# Patient Record
Sex: FEMALE | Race: WHITE | Employment: FULL TIME | URBAN - METROPOLITAN AREA
[De-identification: names, ages, dates, MRNs, and addresses within clinical notes are randomized per-mention and may not be internally consistent; named-entity substitution may affect disease eponyms.]

---

## 2022-06-23 ENCOUNTER — OFFICE VISIT (OUTPATIENT)
Dept: OBGYN CLINIC | Age: 22
End: 2022-06-23
Payer: COMMERCIAL

## 2022-06-23 VITALS — WEIGHT: 186 LBS | SYSTOLIC BLOOD PRESSURE: 104 MMHG | DIASTOLIC BLOOD PRESSURE: 60 MMHG

## 2022-06-23 DIAGNOSIS — N64.52 BILATERAL NIPPLE DISCHARGE: Primary | ICD-10-CM

## 2022-06-23 PROCEDURE — 99204 OFFICE O/P NEW MOD 45 MIN: CPT | Performed by: ADVANCED PRACTICE MIDWIFE

## 2022-06-23 RX ORDER — ETONOGESTREL 68 MG/1
68 IMPLANT SUBCUTANEOUS ONCE
COMMUNITY
Start: 2019-11-21

## 2022-06-23 RX ORDER — SULFAMETHOXAZOLE AND TRIMETHOPRIM 800; 160 MG/1; MG/1
1 TABLET ORAL 2 TIMES DAILY
Qty: 10 TABLET | Refills: 0 | Status: SHIPPED | OUTPATIENT
Start: 2022-06-23 | End: 2022-06-28

## 2022-06-24 ASSESSMENT — ENCOUNTER SYMPTOMS
SHORTNESS OF BREATH: 0
VOMITING: 0
COUGH: 0
NAUSEA: 0
DIARRHEA: 0
ABDOMINAL PAIN: 0
CONSTIPATION: 0

## 2022-06-24 NOTE — PROGRESS NOTES
SUBJECTIVE:  Davina Dailey is a 20 y.o. female who presents here today for complaints of:      Chief Complaint   Patient presents with    Breast Problem     greenish disharge coming out of nipples x 2 months     Bilateral Nipple Discharge  Began noticing small/scant amounts of bilateral nipple discharge that is green in color. Denies breast pain/tenderness, inflammation, fever, skin changes, or any other unusual or abnormal breast symptoms. Denies nipple pain, sensitivity. Denies a history of nipple piercing, no breast tattoos. Symptoms began about 2 months ago and have neither worsened or improved over this time. Acknowledges that there is a possibility of infection caused by her boyfriends mouth. Review of Systems   Respiratory: Negative for cough and shortness of breath. Gastrointestinal: Negative for abdominal pain, constipation, diarrhea, nausea and vomiting. Genitourinary: Negative for difficulty urinating, dysuria, menstrual problem, pelvic pain, vaginal bleeding and vaginal discharge. All other systems reviewed and are negative. OBJECTIVE:  Vitals:  /60   Wt 186 lb (84.4 kg)     Physical Exam  Constitutional:       General: She is not in acute distress. Appearance: Normal appearance. She is not ill-appearing. HENT:      Mouth/Throat:      Mouth: Mucous membranes are moist.   Eyes:      General: No scleral icterus. Right eye: No discharge. Left eye: No discharge. Cardiovascular:      Rate and Rhythm: Normal rate. Pulmonary:      Effort: Pulmonary effort is normal. No respiratory distress. Chest:   Breasts: Breasts are symmetrical.      Right: Nipple discharge present. No swelling, bleeding, inverted nipple, mass, skin change, tenderness, axillary adenopathy or supraclavicular adenopathy. Left: Nipple discharge present. No swelling, bleeding, inverted nipple, mass, skin change, tenderness, axillary adenopathy or supraclavicular adenopathy. Comments: Bilateral nipple discharge with blue coloring, non-purulent  Abdominal:      Palpations: Abdomen is soft. Musculoskeletal:         General: Normal range of motion. Cervical back: Normal range of motion and neck supple. Right lower leg: No edema. Left lower leg: No edema. Lymphadenopathy:      Upper Body:      Right upper body: No supraclavicular, axillary or pectoral adenopathy. Left upper body: No supraclavicular, axillary or pectoral adenopathy. Skin:     General: Skin is warm and dry. Capillary Refill: Capillary refill takes less than 2 seconds. Coloration: Skin is not jaundiced or pale. Neurological:      Mental Status: She is alert and oriented to person, place, and time. Mental status is at baseline. Psychiatric:         Mood and Affect: Mood normal.         Behavior: Behavior normal.       ASSESSMENT & PLAN:   Diagnosis Orders   1.  Bilateral nipple discharge  Ambulatory referral to Family Practice    CBC with Auto Differential    TSH with Reflex    Prolactin    sulfamethoxazole-trimethoprim (BACTRIM DS;SEPTRA DS) 800-160 MG per tablet    Cytology, Non-Gyn    Cytology, Non-Gyn    US BREAST COMPLETE LEFT    US BREAST COMPLETE RIGHT        1. Bilateral Nipple Discharge  Benign breast exam  Antibiotic Therapy - start Bactrim  Obtain bilateral breast US with diagnostic MMG PRN  Labs - CBC, TSH, Prolactin  Cytology - RTC anytime tomorrow to collect discharge sample for cytology     Return in about 2 weeks (around 7/7/2022) for Bilateral nipple discharge, & annual exam.    JALEN Lee - YURY

## 2022-06-26 DIAGNOSIS — N64.52 BILATERAL NIPPLE DISCHARGE: ICD-10-CM

## 2022-06-30 ENCOUNTER — HOSPITAL ENCOUNTER (OUTPATIENT)
Dept: ULTRASOUND IMAGING | Age: 22
Discharge: HOME | End: 2022-07-02
Payer: COMMERCIAL

## 2022-06-30 DIAGNOSIS — N64.52 BILATERAL NIPPLE DISCHARGE: ICD-10-CM

## 2022-06-30 PROCEDURE — 76641 ULTRASOUND BREAST COMPLETE: CPT

## 2022-08-01 ENCOUNTER — TELEMEDICINE (OUTPATIENT)
Dept: FAMILY MEDICINE CLINIC | Age: 22
End: 2022-08-01
Payer: COMMERCIAL

## 2022-08-01 DIAGNOSIS — N60.02 BILATERAL BREAST CYSTS: ICD-10-CM

## 2022-08-01 DIAGNOSIS — N60.01 BILATERAL BREAST CYSTS: ICD-10-CM

## 2022-08-01 DIAGNOSIS — K59.09 CHRONIC CONSTIPATION: ICD-10-CM

## 2022-08-01 DIAGNOSIS — R63.5 WEIGHT GAIN: ICD-10-CM

## 2022-08-01 DIAGNOSIS — Z80.8 FAMILY HISTORY OF THYROID CANCER: ICD-10-CM

## 2022-08-01 DIAGNOSIS — R53.82 CHRONIC FATIGUE: ICD-10-CM

## 2022-08-01 DIAGNOSIS — Z13.220 SCREENING CHOLESTEROL LEVEL: ICD-10-CM

## 2022-08-01 DIAGNOSIS — F33.41 RECURRENT MAJOR DEPRESSIVE DISORDER, IN PARTIAL REMISSION (HCC): Primary | ICD-10-CM

## 2022-08-01 DIAGNOSIS — Z91.51 HISTORY OF SUICIDE ATTEMPT: ICD-10-CM

## 2022-08-01 DIAGNOSIS — N64.52 NIPPLE DISCHARGE: ICD-10-CM

## 2022-08-01 PROCEDURE — 99215 OFFICE O/P EST HI 40 MIN: CPT | Performed by: PHYSICIAN ASSISTANT

## 2022-08-01 RX ORDER — DOCUSATE SODIUM 100 MG/1
CAPSULE, LIQUID FILLED ORAL
Qty: 60 CAPSULE | Refills: 5 | Status: SHIPPED | OUTPATIENT
Start: 2022-08-01 | End: 2022-08-16 | Stop reason: SDUPTHER

## 2022-08-01 RX ORDER — GREEN TEA/HOODIA GORDONII 315-12.5MG
1 CAPSULE ORAL DAILY
Qty: 30 TABLET | Refills: 0 | Status: SHIPPED | OUTPATIENT
Start: 2022-08-01 | End: 2022-08-16 | Stop reason: SDUPTHER

## 2022-08-01 RX ORDER — FAMOTIDINE 40 MG/1
40 TABLET, FILM COATED ORAL NIGHTLY PRN
Qty: 30 TABLET | Refills: 3 | Status: SHIPPED | OUTPATIENT
Start: 2022-08-01 | End: 2022-08-16 | Stop reason: SDUPTHER

## 2022-08-01 RX ORDER — WHEAT DEXTRIN/ASPARTAME 3 G/6 G
POWDER IN PACKET (EA) ORAL
Qty: 28 EACH | Refills: 5 | Status: SHIPPED | OUTPATIENT
Start: 2022-08-01 | End: 2022-08-16 | Stop reason: SDUPTHER

## 2022-08-01 SDOH — ECONOMIC STABILITY: FOOD INSECURITY: WITHIN THE PAST 12 MONTHS, THE FOOD YOU BOUGHT JUST DIDN'T LAST AND YOU DIDN'T HAVE MONEY TO GET MORE.: NEVER TRUE

## 2022-08-01 SDOH — ECONOMIC STABILITY: FOOD INSECURITY: WITHIN THE PAST 12 MONTHS, YOU WORRIED THAT YOUR FOOD WOULD RUN OUT BEFORE YOU GOT MONEY TO BUY MORE.: SOMETIMES TRUE

## 2022-08-01 ASSESSMENT — PATIENT HEALTH QUESTIONNAIRE - PHQ9
SUM OF ALL RESPONSES TO PHQ9 QUESTIONS 1 & 2: 0
1. LITTLE INTEREST OR PLEASURE IN DOING THINGS: 0
SUM OF ALL RESPONSES TO PHQ QUESTIONS 1-9: 0
2. FEELING DOWN, DEPRESSED OR HOPELESS: 0
SUM OF ALL RESPONSES TO PHQ QUESTIONS 1-9: 0

## 2022-08-01 ASSESSMENT — ENCOUNTER SYMPTOMS
RECTAL PAIN: 1
DIARRHEA: 0
VOMITING: 0
ABDOMINAL PAIN: 1
COLOR CHANGE: 0
BLOOD IN STOOL: 0
CONSTIPATION: 1
ANAL BLEEDING: 0
SHORTNESS OF BREATH: 0
CHEST TIGHTNESS: 0
NAUSEA: 0
ABDOMINAL DISTENTION: 1
COUGH: 0

## 2022-08-01 ASSESSMENT — SOCIAL DETERMINANTS OF HEALTH (SDOH): HOW HARD IS IT FOR YOU TO PAY FOR THE VERY BASICS LIKE FOOD, HOUSING, MEDICAL CARE, AND HEATING?: SOMEWHAT HARD

## 2022-08-01 NOTE — PROGRESS NOTES
Constitutional:  Positive for activity change, fatigue and unexpected weight change. Negative for chills. Respiratory:  Negative for cough, chest tightness and shortness of breath. Cardiovascular:  Negative for chest pain, palpitations and leg swelling. Gastrointestinal:  Positive for abdominal distention, abdominal pain, constipation and rectal pain. Negative for anal bleeding, blood in stool, diarrhea, nausea and vomiting. Musculoskeletal:  Negative for arthralgias. Skin:  Negative for color change and rash. Neurological:  Negative for dizziness, light-headedness and headaches. Psychiatric/Behavioral:  Negative for agitation, behavioral problems, dysphoric mood and sleep disturbance. The patient is not nervous/anxious. Prior to Visit Medications    Medication Sig Taking? Authorizing Provider   Wheat Dextrin (BENEFIBER DRINK MIX) PACK 1 packet daily. Yes Maria G Martell PA-C   Probiotic Acidophilus (FLORANEX) TABS Take 1 tablet by mouth in the morning. Yes Maria G Martell PA-C   docusate sodium (COLACE) 100 MG capsule Take 1 to 2 capsules daily for chronic hard stool. Yes Maria G Martell PA-C   famotidine (PEPCID) 40 MG tablet Take 1 tablet by mouth nightly as needed (reflux) Yes Maria G Martell PA-C   etonogestrel (NEXPLANON) 68 MG implant 68 mg by Subdermal route once Yes Historical Provider, MD       Social History     Tobacco Use    Smoking status: Never    Smokeless tobacco: Never   Substance Use Topics    Alcohol use: Never    Drug use: Yes     Types: Marijuana (Weed)        No Known Allergies, History reviewed. No pertinent past medical history. ,   Past Surgical History:   Procedure Laterality Date    MOUTH SURGERY      WRIST SURGERY     ,   Social History     Tobacco Use    Smoking status: Never    Smokeless tobacco: Never   Substance Use Topics    Alcohol use: Never    Drug use: Yes     Types: Marijuana Zach Blow)   ,   Family History   Problem Relation Age of Onset    Cancer Mother thyroid    Diabetes Father     Breast Cancer Neg Hx    ,   There is no immunization history on file for this patient.,   Health Maintenance   Topic Date Due    COVID-19 Vaccine (1) Never done    Varicella vaccine (1 of 2 - 2-dose childhood series) Never done    Pneumococcal 0-64 years Vaccine (1 - PCV) Never done    HPV vaccine (1 - 2-dose series) Never done    Depression Screen  Never done    HIV screen  Never done    Chlamydia screen  Never done    Hepatitis C screen  Never done    DTaP/Tdap/Td vaccine (1 - Tdap) Never done    Pap smear  Never done    Flu vaccine (1) 09/01/2022    Hepatitis A vaccine  Aged Out    Hepatitis B vaccine  Aged Out    Hib vaccine  Aged Out    Meningococcal (ACWY) vaccine  Aged Out       PHYSICAL EXAMINATION:  [ INSTRUCTIONS:  \"[x]\" Indicates a positive item  \"[]\" Indicates a negative item  -- DELETE ALL ITEMS NOT EXAMINED]  [x] Alert  [x] Oriented to person/place/time    [x] No apparent distress  [] Toxic appearing    [] Face flushed appearing [] Sclera clear  [] Lips are cyanotic      [x] Breathing appears normal  [] Appears tachypneic      [] Rash on visible skin    [] Cranial Nerves II-XII grossly intact    [] Motor grossly intact in visible upper extremities    [] Motor grossly intact in visible lower extremities    [] Normal Mood  [] Anxious appearing    [] Depressed appearing  [] Confused appearing      [] Poor short term memory  [] Poor long term memory    [] OTHER:      Due to this being a TeleHealth encounter, evaluation of the following organ systems is limited: Vitals/Constitutional/EENT/Resp/CV/GI//MS/Neuro/Skin/Heme-Lymph-Imm. ASSESSMENT/PLAN:  1. Recurrent major depressive disorder, in partial remission ([address]  No medication at this time. Denies depressed mood. Open to something like genesight. Open to therapy. 2. Bilateral breast cysts    - Saundra Rees MD, Breast Surgery, [place]    3.  Family history of thyroid cancer    - US HEAD NECK SOFT TISSUE THYROID; Future  - TSH; Future  - T4, Free; Future    4. Weight gain    - US HEAD NECK SOFT TISSUE THYROID; Future  - TSH; Future  - T4, Free; Future    5. Chronic fatigue    - US HEAD NECK SOFT TISSUE THYROID; Future  - TSH; Future  - T4, Free; Future  - Vitamin D 25 Hydroxy; Future  - Vitamin B12 & Folate; Future    6. Nipple discharge    - CBC with Auto Differential; Future  - Comprehensive Metabolic Panel; Future  - TSH; Future  - T4, Free; Future  - Prolactin; Future    7. Screening cholesterol level    - Lipid, Fasting; Future    8. History of suicide attempt      9. Chronic constipation    - Wheat Dextrin (BENEFIBER DRINK MIX) PACK; 1 packet daily. Dispense: 28 each; Refill: 5  - Probiotic Acidophilus (FLORANEX) TABS; Take 1 tablet by mouth in the morning. Dispense: 30 tablet; Refill: 0  - docusate sodium (COLACE) 100 MG capsule; Take 1 to 2 capsules daily for chronic hard stool. Dispense: 60 capsule; Refill: 5    Return in about 2 weeks (around 8/15/2022) for follow up in office. An  electronic signature was used to authenticate this note. --Chaka Silva PA-C on 8/1/2022 at 11:03 AM        Pursuant to the emergency declaration under the [facility] [place] [facility], [facility] waiver authority and the [employer] Act, this Virtual  Visit was conducted, with patient's consent, to reduce the patient's risk of exposure to COVID-19 and provide continuity of care for an established patient. Services were provided through a video synchronous discussion virtually to substitute for in-person clinic visit.

## 2022-08-05 ENCOUNTER — OFFICE VISIT (OUTPATIENT)
Dept: OBGYN CLINIC | Age: 22
End: 2022-08-05
Payer: COMMERCIAL

## 2022-08-05 VITALS
SYSTOLIC BLOOD PRESSURE: 110 MMHG | DIASTOLIC BLOOD PRESSURE: 62 MMHG | BODY MASS INDEX: 29.07 KG/M2 | HEIGHT: 68 IN | WEIGHT: 191.8 LBS

## 2022-08-05 DIAGNOSIS — Z30.46 ENCOUNTER FOR SURVEILLANCE OF NEXPLANON SUBDERMAL CONTRACEPTIVE: ICD-10-CM

## 2022-08-05 DIAGNOSIS — Z30.013 ENCOUNTER FOR INITIAL PRESCRIPTION OF INJECTABLE CONTRACEPTIVE: ICD-10-CM

## 2022-08-05 DIAGNOSIS — N94.6 DYSMENORRHEA: ICD-10-CM

## 2022-08-05 DIAGNOSIS — N64.52 BILATERAL NIPPLE DISCHARGE: Primary | ICD-10-CM

## 2022-08-05 PROCEDURE — 99214 OFFICE O/P EST MOD 30 MIN: CPT | Performed by: ADVANCED PRACTICE MIDWIFE

## 2022-08-05 RX ORDER — MEDROXYPROGESTERONE ACETATE 150 MG/ML
150 INJECTION, SUSPENSION INTRAMUSCULAR
Qty: 1 ML | Refills: 3 | Status: SHIPPED | OUTPATIENT
Start: 2022-08-05 | End: 2023-08-05

## 2022-08-06 ASSESSMENT — ENCOUNTER SYMPTOMS
SHORTNESS OF BREATH: 0
VOMITING: 0
COUGH: 0
CONSTIPATION: 0
ABDOMINAL PAIN: 0
NAUSEA: 0
DIARRHEA: 0

## 2022-08-06 NOTE — PROGRESS NOTES
SUBJECTIVE:  Christian Galan is a 20 y.o. female who presents here today for complaints of:      Chief Complaint   Patient presents with    Results     US results     Bilateral Nipple Discharge  Discharge has resolved from left breast, decreased from the right breast.  She has not been able to complete previously ordered lab work due to her work schedule. She will try to complete on a Saturday. Reviewed ultrasound results, questions were encouraged and answered. Dysmenorrhea   Utilizing hormonal contraception to relieve uncomfortable menstrual symptoms. Previously placed Nexplanon is approaching completion of therapy. She would like to transition from Nexplanon to Depo Provera due to gradual weight gain that has occurred since beginning the Nexplanon. Review of Systems   Respiratory:  Negative for cough and shortness of breath. Gastrointestinal:  Negative for abdominal pain, constipation, diarrhea, nausea and vomiting. Genitourinary:  Negative for difficulty urinating, dysuria, menstrual problem, pelvic pain, vaginal bleeding and vaginal discharge. All other systems reviewed and are negative. OBJECTIVE:  Vitals:  /62   Ht 5' 8\" (1.727 m)   Wt 191 lb 12.8 oz (87 kg)   BMI 29.16 kg/m²     Physical Exam  Appearance:  Normal appearance  Cardiovascular:  Normal rate, Capillary refill less than 2 seconds  Pulmonary:  Normal effort, no distress  Abdominal:  No tenderness  MS:  No Swelling, No dependent edema  Skin:  Warm, dry  Neuro:  Alert and oriented x3, reflexes normal.  Psychiatric:  Normal mood and behavior    ASSESSMENT & PLAN:   Diagnosis Orders   1. Bilateral nipple discharge        2. Dysmenorrhea  medroxyPROGESTERone (DEPO-PROVERA) 150 MG/ML injection      3.  Encounter for initial prescription of injectable contraceptive            Bilateral Nipple Discharge  Resolved on the left breast, decreasing from the right breast.  US results reviewed  Complete previously ordered labs    Dysmenorrhea  Wishes to initiate a hormonal contraceptive method to relieve uncomfortable menstrual symptoms. Depo Provera, Initial Prescription  Rx for Depo sent  RTC for first injection    Nexplanon Surveillance  Implant approaching completion of therapy. RTC in 2 weeks for removal    Return in 5 days (on 8/10/2022) for Depo Provera.     JALEN Lambert CNM

## 2022-08-10 ENCOUNTER — HOSPITAL ENCOUNTER (EMERGENCY)
Age: 22
Discharge: HOME | End: 2022-08-10
Attending: EMERGENCY MEDICINE
Payer: COMMERCIAL

## 2022-08-10 VITALS
BODY MASS INDEX: 28.79 KG/M2 | WEIGHT: 190 LBS | TEMPERATURE: 98.3 F | OXYGEN SATURATION: 99 % | HEART RATE: 73 BPM | HEIGHT: 68 IN | SYSTOLIC BLOOD PRESSURE: 117 MMHG | RESPIRATION RATE: 16 BRPM | DIASTOLIC BLOOD PRESSURE: 69 MMHG

## 2022-08-10 DIAGNOSIS — R53.82 CHRONIC FATIGUE: ICD-10-CM

## 2022-08-10 DIAGNOSIS — Z80.8 FAMILY HISTORY OF THYROID CANCER: ICD-10-CM

## 2022-08-10 DIAGNOSIS — N64.52 NIPPLE DISCHARGE: ICD-10-CM

## 2022-08-10 DIAGNOSIS — Z13.220 SCREENING CHOLESTEROL LEVEL: ICD-10-CM

## 2022-08-10 DIAGNOSIS — R55 VASOVAGAL EPISODE: Primary | ICD-10-CM

## 2022-08-10 DIAGNOSIS — R63.5 WEIGHT GAIN: ICD-10-CM

## 2022-08-10 LAB
ALBUMIN SERPL-MCNC: 4.6 G/DL (ref 3.5–4.6)
ALP BLD-CCNC: 59 U/L (ref 40–130)
ALT SERPL-CCNC: 12 U/L (ref 0–33)
ANION GAP SERPL CALCULATED.3IONS-SCNC: 12 MEQ/L (ref 9–15)
AST SERPL-CCNC: 14 U/L (ref 0–35)
BASOPHILS ABSOLUTE: 0.1 K/UL (ref 0–0.2)
BASOPHILS RELATIVE PERCENT: 0.8 %
BILIRUB SERPL-MCNC: <0.2 MG/DL (ref 0.2–0.7)
BUN BLDV-MCNC: 10 MG/DL (ref 6–20)
CALCIUM SERPL-MCNC: 9.1 MG/DL (ref 8.5–9.9)
CHLORIDE BLD-SCNC: 103 MEQ/L (ref 95–107)
CHOLESTEROL, FASTING: 175 MG/DL (ref 0–199)
CO2: 22 MEQ/L (ref 20–31)
CREAT SERPL-MCNC: 0.63 MG/DL (ref 0.5–0.9)
EOSINOPHILS ABSOLUTE: 0.1 K/UL (ref 0–0.7)
EOSINOPHILS RELATIVE PERCENT: 1.3 %
GFR AFRICAN AMERICAN: >60
GFR NON-AFRICAN AMERICAN: >60
GLOBULIN: 2.8 G/DL (ref 2.3–3.5)
GLUCOSE BLD-MCNC: 88 MG/DL (ref 70–99)
HCT VFR BLD CALC: 41.6 % (ref 37–47)
HDLC SERPL-MCNC: 40 MG/DL (ref 40–59)
HEMOGLOBIN: 14 G/DL (ref 12–16)
LDL CHOLESTEROL CALCULATED: 119 MG/DL (ref 0–129)
LYMPHOCYTES ABSOLUTE: 2.3 K/UL (ref 1–4.8)
LYMPHOCYTES RELATIVE PERCENT: 32.1 %
MCH RBC QN AUTO: 30.2 PG (ref 27–31.3)
MCHC RBC AUTO-ENTMCNC: 33.8 % (ref 33–37)
MCV RBC AUTO: 89.6 FL (ref 82–100)
MONOCYTES ABSOLUTE: 0.5 K/UL (ref 0.2–0.8)
MONOCYTES RELATIVE PERCENT: 7.5 %
NEUTROPHILS ABSOLUTE: 4.1 K/UL (ref 1.4–6.5)
NEUTROPHILS RELATIVE PERCENT: 58.3 %
PDW BLD-RTO: 13.4 % (ref 11.5–14.5)
PLATELET # BLD: 224 K/UL (ref 130–400)
POTASSIUM SERPL-SCNC: 4.2 MEQ/L (ref 3.4–4.9)
PROLACTIN: 8.5 NG/ML
RBC # BLD: 4.65 M/UL (ref 4.2–5.4)
SODIUM BLD-SCNC: 137 MEQ/L (ref 135–144)
T4 FREE: 1.31 NG/DL (ref 0.84–1.68)
TOTAL PROTEIN: 7.4 G/DL (ref 6.3–8)
TRIGLYCERIDE, FASTING: 79 MG/DL (ref 0–150)
TSH SERPL DL<=0.05 MIU/L-ACNC: 0.45 UIU/ML (ref 0.44–3.86)
WBC # BLD: 7.1 K/UL (ref 4.8–10.8)

## 2022-08-10 PROCEDURE — 99282 EMERGENCY DEPT VISIT SF MDM: CPT

## 2022-08-10 ASSESSMENT — ENCOUNTER SYMPTOMS
ABDOMINAL PAIN: 0
CHEST TIGHTNESS: 0
VOMITING: 0
PHOTOPHOBIA: 0
ABDOMINAL DISTENTION: 0
SORE THROAT: 0
SHORTNESS OF BREATH: 0
WHEEZING: 0
COUGH: 0
EYE DISCHARGE: 0

## 2022-08-10 ASSESSMENT — PAIN - FUNCTIONAL ASSESSMENT
PAIN_FUNCTIONAL_ASSESSMENT: NONE - DENIES PAIN
PAIN_FUNCTIONAL_ASSESSMENT: NONE - DENIES PAIN

## 2022-08-10 NOTE — SIGNIFICANT EVENT
Rapid response called to medical office building outpatient [address] (outpatient laboratory) at 1710 hrs. Rapid response was called before hospital  realized that this was supposed to be an external 911 referral.  Staff already onsite by that time. Patient is a 71-year-old female who was undergoing laboratory draw, passed out in phlebotomy chair. Lowered to the ground without falling or head/neck injury by staff and family member present. Woke up after very brief syncopal event. Diaphoretic, lightheaded, reported numbness in bilateral hands. She reports that she does have a history of lightheadedness and previous syncopal events during phlebotomy, but usually occurs exactly as phlebotomy is happening and not several seconds afterwards. Does report that she has been fasting for phlebotomy draw today, which may have made symptoms more significant. Reports that she has no major medical conditions. Does report that she had a possible memory infection of some kind recently, but not very severe and still undergoing work-up to see whether or not that is an actual infection or not. Denies any history of epilepsy or diabetes. Denies any history of known cardiac arrhythmias. No other new/acute complaints at present. Continues to improve during course of encounter, but not yet back fully at baseline. Decision made to transport patient to ED for more thorough evaluation, though presentation is most consistent initially with vasovagal syncope associated with phlebotomy in setting of fasting.     Electronically signed by Familia Zhang DO on 8/10/2022 at 5:35 PM

## 2022-08-10 NOTE — ED PROVIDER NOTES
[address] [facility] ED  eMERGENCY dEPARTMENT eNCOUnter      Pt Name: Kari Gonzalez  MRN: [id number]  [place] 2000  Date of evaluation: 8/10/2022  Provider: Micheline Marcos MD    CHIEF COMPLAINT       Chief Complaint   Patient presents with    Loss of Consciousness     Pt pasted out after having blood drawn today         HISTORY OF PRESENT ILLNESS   (Location/Symptom, Timing/Onset,Context/Setting, Quality, Duration, Modifying Factors, Severity)  Note limiting factors. Kari Gonzalez is a 20 y.o. female who presents to the emergency department for evaluation of syncope. Patient had her blood drawn today. She was n.p.o. because of the testing she was having done including thyroid. While getting the blood drawn she became lightheaded pale and passed out. She feels better now. They were able to draw the blood. No other complaints currently. Color looks good currently. HPI    NursingNotes were reviewed. REVIEW OF SYSTEMS    (2-9 systems for level 4, 10 or more for level 5)     Review of Systems   Constitutional:  Negative for chills and diaphoresis. HENT:  Negative for congestion, ear pain, mouth sores and sore throat. Eyes:  Negative for photophobia and discharge. Respiratory:  Negative for cough, chest tightness, shortness of breath and wheezing. Cardiovascular:  Negative for chest pain and palpitations. Gastrointestinal:  Negative for abdominal distention, abdominal pain and vomiting. Endocrine: Negative for cold intolerance. Genitourinary:  Negative for difficulty urinating. Musculoskeletal:  Negative for arthralgias. Skin:  Negative for pallor and rash. Allergic/Immunologic: Negative for immunocompromised state. Neurological:  Positive for syncope. Negative for dizziness. Hematological:  Negative for adenopathy. Psychiatric/Behavioral:  Negative for agitation and hallucinations. All other systems reviewed and are negative.     Except as noted above the remainder of the review of systems was reviewed and negative. PAST MEDICAL HISTORY   History reviewed. No pertinent past medical history. SURGICALHISTORY       Past Surgical History:   Procedure Laterality Date    MOUTH SURGERY      WRIST SURGERY           CURRENT MEDICATIONS       Previous Medications    DOCUSATE SODIUM (COLACE) 100 MG CAPSULE    Take 1 to 2 capsules daily for chronic hard stool. ETONOGESTREL (NEXPLANON) 68 MG IMPLANT    68 mg by Subdermal route once    FAMOTIDINE (PEPCID) 40 MG TABLET    Take 1 tablet by mouth nightly as needed (reflux)    MEDROXYPROGESTERONE (DEPO-PROVERA) 150 MG/ML INJECTION    Inject 1 mL into the muscle every 3 months    PROBIOTIC ACIDOPHILUS (FLORANEX) TABS    Take 1 tablet by mouth in the morning. WHEAT DEXTRIN (BENEFIBER DRINK MIX) PACK    1 packet daily. ALLERGIES     Patient has no known allergies.     FAMILY HISTORY       Family History   Problem Relation Age of Onset    Cancer Mother         thyroid    Diabetes Father     Breast Cancer Neg Hx           SOCIAL HISTORY       Social History     Socioeconomic History    Marital status: Single     Spouse name: None    Number of children: None    Years of education: None    Highest education level: None   Tobacco Use    Smoking status: Never    Smokeless tobacco: Never   Vaping Use    Vaping Use: Never used   Substance and Sexual Activity    Alcohol use: Never    Drug use: Yes     Types: Marijuana Tasneem Paez)     Social Determinants of Health     Financial Resource Strain: Medium Risk    Difficulty of Paying Living Expenses: Somewhat hard   Food Insecurity: Food Insecurity Present    Worried About [address] in the Last Year: Sometimes true    Ran Out of Food in the Last Year: Never true       SCREENINGS    Amilcar Coma Scale  Eye Opening: Spontaneous  Best Verbal Response: Oriented  Best Motor Response: Obeys commands  Amilcar Coma Scale Score: 15 @FLOW([id number])@      PHYSICAL EXAM    (up to 7 for level 4, 8 or DIFFERENTIAL DIAGNOSIS/MDM:   Vitals:    Vitals:    08/10/22 1729   BP: 117/69   Pulse: 73   Resp: 16   Temp: 98.3 °F (36.8 °C)   TempSrc: Oral   SpO2: 99%   Weight: 190 lb (86.2 kg)   Height: 5' 8\" (1.727 m)          MDM    On recheck patient doing well. She had something to eat and drink. All signs have remained normal.  Discharged home improved. CONSULTS:  None    PROCEDURES:  Unless otherwise noted below, none     Procedures    FINAL IMPRESSION      1. Vasovagal episode          DISPOSITION/PLAN   DISPOSITION Decision To Discharge 08/10/2022 05:49:50 PM      PATIENT REFERRED TO:  No follow-up provider specified.     DISCHARGE MEDICATIONS:  New Prescriptions    No medications on file          (Please note that portions of this note were completed with a voice recognition program.  Efforts were made to edit the dictations but occasionally words are mis-transcribed.)    Daria Lemos MD (electronically signed)  Attending Emergency Physician          Daria Lemos MD  08/10/22 [id number]

## 2022-08-10 NOTE — ED TRIAGE NOTES
Pt was brought to the ER from the [facility] after a syncopal episode after having blood drawn, Pt is A&OX3, calm, ambulatory, afebrile, breathes are equal and unlabored, denies any distress at this time,

## 2022-08-11 LAB
FOLATE: 7.7 NG/ML
VITAMIN B-12: 337 PG/ML (ref 232–1245)
VITAMIN D 25-HYDROXY: 18.6 NG/ML

## 2022-08-14 DIAGNOSIS — E55.9 VITAMIN D DEFICIENCY: Primary | ICD-10-CM

## 2022-08-14 RX ORDER — IRON HEME POLYPEPTIDE/FOLIC AC 12-1MG
5000 TABLET ORAL DAILY
Qty: 90 CAPSULE | Refills: 1 | Status: SHIPPED | OUTPATIENT
Start: 2022-08-14 | End: 2022-08-16 | Stop reason: SDUPTHER

## 2022-08-16 ENCOUNTER — TELEMEDICINE (OUTPATIENT)
Dept: FAMILY MEDICINE CLINIC | Age: 22
End: 2022-08-16
Payer: COMMERCIAL

## 2022-08-16 DIAGNOSIS — K59.09 CHRONIC CONSTIPATION: ICD-10-CM

## 2022-08-16 DIAGNOSIS — E55.9 VITAMIN D DEFICIENCY: ICD-10-CM

## 2022-08-16 DIAGNOSIS — F33.41 RECURRENT MAJOR DEPRESSIVE DISORDER, IN PARTIAL REMISSION (HCC): Primary | ICD-10-CM

## 2022-08-16 PROCEDURE — 99213 OFFICE O/P EST LOW 20 MIN: CPT | Performed by: PHYSICIAN ASSISTANT

## 2022-08-16 RX ORDER — GREEN TEA/HOODIA GORDONII 315-12.5MG
1 CAPSULE ORAL DAILY
Qty: 30 TABLET | Refills: 0 | Status: SHIPPED | OUTPATIENT
Start: 2022-08-16 | End: 2022-09-15

## 2022-08-16 RX ORDER — DOCUSATE SODIUM 100 MG/1
CAPSULE, LIQUID FILLED ORAL
Qty: 60 CAPSULE | Refills: 5 | Status: SHIPPED | OUTPATIENT
Start: 2022-08-16

## 2022-08-16 RX ORDER — WHEAT DEXTRIN/ASPARTAME 3 G/6 G
POWDER IN PACKET (EA) ORAL
Qty: 28 EACH | Refills: 5 | Status: SHIPPED | OUTPATIENT
Start: 2022-08-16

## 2022-08-16 RX ORDER — FAMOTIDINE 40 MG/1
40 TABLET, FILM COATED ORAL NIGHTLY PRN
Qty: 30 TABLET | Refills: 3 | Status: SHIPPED | OUTPATIENT
Start: 2022-08-16 | End: 2022-10-24 | Stop reason: SDUPTHER

## 2022-08-16 RX ORDER — IRON HEME POLYPEPTIDE/FOLIC AC 12-1MG
5000 TABLET ORAL DAILY
Qty: 90 CAPSULE | Refills: 1 | Status: SHIPPED | OUTPATIENT
Start: 2022-08-16

## 2022-08-16 ASSESSMENT — ENCOUNTER SYMPTOMS
BLOOD IN STOOL: 0
CONSTIPATION: 1
ABDOMINAL DISTENTION: 1
RECTAL PAIN: 1
NAUSEA: 0
ABDOMINAL PAIN: 1
DIARRHEA: 0
NAUSEA: 0
VOMITING: 0
CHEST TIGHTNESS: 0
CHEST TIGHTNESS: 0
ABDOMINAL DISTENTION: 1
SHORTNESS OF BREATH: 0
COLOR CHANGE: 0
ANAL BLEEDING: 0
COUGH: 0
COLOR CHANGE: 0
VOMITING: 0
SHORTNESS OF BREATH: 0
BLOOD IN STOOL: 0
ABDOMINAL PAIN: 1
COUGH: 0
DIARRHEA: 0
RECTAL PAIN: 1
ANAL BLEEDING: 0
CONSTIPATION: 1

## 2022-08-16 NOTE — PROGRESS NOTES
2022    TELEHEALTH EVALUATION -- Audio/Visual (During OOVJH-62 public health emergency)    Due to [place] 19 outbreak, patient's office visit was converted to a virtual visit. Patient was contacted and agreed to proceed with a virtual visit via Telephone Visit  The risks and benefits of converting to a virtual visit were discussed in light of the current infectious disease epidemic. Patient also understood that insurance coverage and co-pays are up to their individual insurance plans. HPI:    Zaki Quintana (:  2000) has requested an audio/video evaluation for the following concern(s):    VV today to review labs. Did not  medication from pharmacy that was previously ordered. Had a vasovagal episode after labs. Was taken to ED for observation. Had no further episodes since. This has happened prior with blood draws. Anxiety and depression stable. Has PAP with me Friday. Scheduled to have nexplanon removed and depo-medrol started.     Results for orders placed or performed in visit on 08/10/22   CBC with Auto Differential   Result Value Ref Range    WBC 7.1 4.8 - 10.8 K/uL    RBC 4.65 4.20 - 5.40 M/uL    Hemoglobin 14.0 12.0 - 16.0 g/dL    Hematocrit 41.6 37.0 - 47.0 %    MCV 89.6 82.0 - 100.0 fL    MCH 30.2 27.0 - 31.3 pg    MCHC 33.8 33.0 - 37.0 %    RDW 13.4 11.5 - 14.5 %    Platelets 825 452 - 525 K/uL    Neutrophils % 58.3 %    Lymphocytes % 32.1 %    Monocytes % 7.5 %    Eosinophils % 1.3 %    Basophils % 0.8 %    Neutrophils Absolute 4.1 1.4 - 6.5 K/uL    Lymphocytes Absolute 2.3 1.0 - 4.8 K/uL    Monocytes Absolute 0.5 0.2 - 0.8 K/uL    Eosinophils Absolute 0.1 0.0 - 0.7 K/uL    Basophils Absolute 0.1 0.0 - 0.2 K/uL   Comprehensive Metabolic Panel   Result Value Ref Range    Sodium 137 135 - 144 mEq/L    Potassium 4.2 3.4 - 4.9 mEq/L    Chloride 103 95 - 107 mEq/L    CO2 22 20 - 31 mEq/L    Anion Gap 12 9 - 15 mEq/L    Glucose 88 70 - 99 mg/dL    BUN 10 6 - 20 mg/dL Creatinine 0.63 0.50 - 0.90 mg/dL    GFR Non-African American >60.0 >60    GFR  >60.0 >60    Calcium 9.1 8.5 - 9.9 mg/dL    Total Protein 7.4 6.3 - 8.0 g/dL    Albumin 4.6 3.5 - 4.6 g/dL    Total Bilirubin <0.2 0.2 - 0.7 mg/dL    Alkaline Phosphatase 59 40 - 130 U/L    ALT 12 0 - 33 U/L    AST 14 0 - 35 U/L    Globulin 2.8 2.3 - 3.5 g/dL   TSH   Result Value Ref Range    TSH 0.454 0.440 - 3.860 uIU/mL   T4, Free   Result Value Ref Range    T4 Free 1.31 0.84 - 1.68 ng/dL   Vitamin D 25 Hydroxy   Result Value Ref Range    Vit D, 25-Hydroxy 18.6 (L) >29.9 ng/mL   Vitamin B12 & Folate   Result Value Ref Range    Vitamin B-12 337 232 - 1245 pg/mL    Folate 7.7 >4.8 ng/mL   Lipid, Fasting   Result Value Ref Range    Cholesterol, Fasting 175 0 - 199 mg/dL    Triglyceride, Fasting 79 0 - 150 mg/dL    HDL 40 40 - 59 mg/dL    LDL Calculated 119 0 - 129 mg/dL   Prolactin   Result Value Ref Range    Prolactin 8.5 ng/mL     Review of Systems   Constitutional:  Negative for chills and unexpected weight change. Respiratory:  Negative for cough, chest tightness and shortness of breath. Cardiovascular:  Negative for chest pain, palpitations and leg swelling. Gastrointestinal:  Positive for abdominal distention, abdominal pain, constipation and rectal pain. Negative for anal bleeding, blood in stool, diarrhea, nausea and vomiting. Musculoskeletal:  Negative for arthralgias. Skin:  Negative for color change and rash. Neurological:  Negative for dizziness, light-headedness and headaches. Psychiatric/Behavioral:  Negative for agitation, behavioral problems, dysphoric mood and sleep disturbance. The patient is not nervous/anxious. Prior to Visit Medications    Medication Sig Taking?  Authorizing Provider   medroxyPROGESTERone (DEPO-PROVERA) 150 MG/ML injection Inject 1 mL into the muscle every 3 months Yes JALEN Hinds CNM   etonogestrel (NEXPLANON) 68 MG implant 68 mg by Subdermal route once Yes Historical Provider, MD   vitamin D (DIALYVITE VITAMIN D 5000) 125 MCG (5000 UT) CAPS capsule Take 1 capsule by mouth in the morning. Maria G Martell PA-C   Wheat Dextrin (BENEFIBER DRINK MIX) PACK 1 packet daily. Maria G Martell PA-C   Probiotic Acidophilus (FLORANEX) TABS Take 1 tablet by mouth in the morning. Maria G Martell PA-C   docusate sodium (COLACE) 100 MG capsule Take 1 to 2 capsules daily for chronic hard stool.   Maria G Martell PA-C   famotidine (PEPCID) 40 MG tablet Take 1 tablet by mouth nightly as needed (reflux)  Maria G Martell PA-C       Social History     Tobacco Use    Smoking status: Never    Smokeless tobacco: Never   Vaping Use    Vaping Use: Never used   Substance Use Topics    Alcohol use: Never    Drug use: Yes     Types: Marijuana (Weed)        No Known Allergies, No past medical history on file.,   Past Surgical History:   Procedure Laterality Date    MOUTH SURGERY      WRIST SURGERY     ,   Social History     Tobacco Use    Smoking status: Never    Smokeless tobacco: Never   Vaping Use    Vaping Use: Never used   Substance Use Topics    Alcohol use: Never    Drug use: Yes     Types: Marijuana ([place])   ,   Family History   Problem Relation Age of Onset    Cancer Mother         thyroid    Diabetes Father     Breast Cancer Neg Hx    ,   There is no immunization history on file for this patient.,   Health Maintenance   Topic Date Due    COVID-19 Vaccine (1) Never done    Varicella vaccine (1 of 2 - 2-dose childhood series) Never done    HPV vaccine (1 - 2-dose series) Never done    HIV screen  Never done    Chlamydia screen  Never done    Hepatitis C screen  Never done    DTaP/Tdap/Td vaccine (1 - Tdap) Never done    Pap smear  Never done    Flu vaccine (1) 09/01/2022    Depression Monitoring  08/01/2023    Hepatitis A vaccine  Aged Out    Hepatitis B vaccine  Aged Out    Hib vaccine  Aged Out    Meningococcal (ACWY) vaccine  Aged Out    Pneumococcal 0-64 years Vaccine  Aged Out PHYSICAL EXAMINATION:  [ INSTRUCTIONS:  \"[x]\" Indicates a positive item  \"[]\" Indicates a negative item  -- DELETE ALL ITEMS NOT EXAMINED]  [x] Alert  [x] Oriented to person/place/time    [] No apparent distress  [] Toxic appearing    [] Face flushed appearing [] Sclera clear  [] Lips are cyanotic      [x] Breathing appears normal  [] Appears tachypneic      [] Rash on visible skin    [] Cranial Nerves II-XII grossly intact    [] Motor grossly intact in visible upper extremities    [] Motor grossly intact in visible lower extremities    [x] Normal Mood  [] Anxious appearing    [] Depressed appearing  [] Confused appearing      [] Poor short term memory  [] Poor long term memory    [] OTHER:      Due to this being a TeleHealth encounter, evaluation of the following organ systems is limited: Vitals/Constitutional/EENT/Resp/CV/GI//MS/Neuro/Skin/Heme-Lymph-Imm. ASSESSMENT/PLAN:  1. Recurrent major depressive disorder, in partial remission ([facility] [address]      2. Chronic constipation      3. Vitamin D deficiency    Mediations resent today. PAP Friday. No follow-ups on file. An  electronic signature was used to authenticate this note. --Nancy Lezama PA-C on 8/16/2022 at 2:19 PM        Pursuant to the emergency declaration under the [facility] waiver authority and the [email or web address] and [employer] Act, this Virtual  Visit was conducted, with patient's consent, to reduce the patient's risk of exposure to COVID-19 and provide continuity of care for an established patient. Services were provided through a video synchronous discussion virtually to substitute for in-person clinic visit.

## 2022-08-16 NOTE — PROGRESS NOTES
2022    TELEHEALTH EVALUATION -- Audio/Visual (During XBWSL-96 public health emergency)    Due to [place] 19 outbreak, patient's office visit was converted to a virtual visit. Patient was contacted and agreed to proceed with a virtual visit via Telephone Visit  The risks and benefits of converting to a virtual visit were discussed in light of the current infectious disease epidemic. Patient also understood that insurance coverage and co-pays are up to their individual insurance plans. HPI:    Amita Chilmark (:  2000) has requested an audio/video evaluation for the following concern(s):      Vv FOLLOW UP. Would like to discuss lab results. Open to starting Vit D replacement therapy. Had a syncopal episode after having her labs drawn on 8/10. Felt dizzy, light-headed, hands and legs went numb. Was brought to ED for monitoring, went home after an hour. Coming in Friday for a PAP.     Results for orders placed or performed in visit on 08/10/22   CBC with Auto Differential   Result Value Ref Range    WBC 7.1 4.8 - 10.8 K/uL    RBC 4.65 4.20 - 5.40 M/uL    Hemoglobin 14.0 12.0 - 16.0 g/dL    Hematocrit 41.6 37.0 - 47.0 %    MCV 89.6 82.0 - 100.0 fL    MCH 30.2 27.0 - 31.3 pg    MCHC 33.8 33.0 - 37.0 %    RDW 13.4 11.5 - 14.5 %    Platelets 904 953 - 754 K/uL    Neutrophils % 58.3 %    Lymphocytes % 32.1 %    Monocytes % 7.5 %    Eosinophils % 1.3 %    Basophils % 0.8 %    Neutrophils Absolute 4.1 1.4 - 6.5 K/uL    Lymphocytes Absolute 2.3 1.0 - 4.8 K/uL    Monocytes Absolute 0.5 0.2 - 0.8 K/uL    Eosinophils Absolute 0.1 0.0 - 0.7 K/uL    Basophils Absolute 0.1 0.0 - 0.2 K/uL   Comprehensive Metabolic Panel   Result Value Ref Range    Sodium 137 135 - 144 mEq/L    Potassium 4.2 3.4 - 4.9 mEq/L    Chloride 103 95 - 107 mEq/L    CO2 22 20 - 31 mEq/L    Anion Gap 12 9 - 15 mEq/L    Glucose 88 70 - 99 mg/dL    BUN 10 6 - 20 mg/dL    Creatinine 0.63 0.50 - 0.90 mg/dL    GFR Non-African American >60.0 [facility]) TABS Take 1 tablet by mouth in the morning. Yes Maria G Martell PA-C   docusate sodium (COLACE) 100 MG capsule Take 1 to 2 capsules daily for chronic hard stool.  Yes Maria G Martell PA-C   famotidine (PEPCID) 40 MG tablet Take 1 tablet by mouth nightly as needed (reflux) Yes Maria G Martell PA-C   medroxyPROGESTERone (DEPO-PROVERA) 150 MG/ML injection Inject 1 mL into the muscle every 3 months  JALEN Hinds CNM   etonogestrel (NEXPLANON) 68 MG implant 68 mg by Subdermal route once  Historical Provider, MD       Social History     Tobacco Use    Smoking status: Never    Smokeless tobacco: Never   Vaping Use    Vaping Use: Never used   Substance Use Topics    Alcohol use: Never    Drug use: Yes     Types: Marijuana (Weed)        No Known Allergies, No past medical history on file.,   Past Surgical History:   Procedure Laterality Date    MOUTH SURGERY      WRIST SURGERY     ,   Social History     Tobacco Use    Smoking status: Never    Smokeless tobacco: Never   Vaping Use    Vaping Use: Never used   Substance Use Topics    Alcohol use: Never    Drug use: Yes     Types: Marijuana ([employer])   ,   Family History   Problem Relation Age of Onset    Cancer Mother         thyroid    Diabetes Father     Breast Cancer Neg Hx    ,   There is no immunization history on file for this patient.,   Health Maintenance   Topic Date Due    COVID-19 Vaccine (1) Never done    Varicella vaccine (1 of 2 - 2-dose childhood series) Never done    HPV vaccine (1 - 2-dose series) Never done    HIV screen  Never done    Chlamydia screen  Never done    Hepatitis C screen  Never done    DTaP/Tdap/Td vaccine (1 - Tdap) Never done    Pap smear  Never done    Flu vaccine (1) 09/01/2022    Depression Monitoring  08/01/2023    Hepatitis A vaccine  Aged Out    Hepatitis B vaccine  Aged Out    Hib vaccine  Aged Out    Meningococcal (ACWY) vaccine  Aged Out    Pneumococcal 0-64 years Vaccine  Aged Out       PHYSICAL EXAMINATION:  [ INSTRUCTIONS:  \"[x]\" Indicates a positive item  \"[]\" Indicates a negative item  -- DELETE ALL ITEMS NOT EXAMINED]  [x] Alert  [x] Oriented to person/place/time    [] No apparent distress  [] Toxic appearing    [] Face flushed appearing [] Sclera clear  [] Lips are cyanotic      [x] Breathing appears normal  [] Appears tachypneic      [] Rash on visible skin    [] Cranial Nerves II-XII grossly intact    [] Motor grossly intact in visible upper extremities    [] Motor grossly intact in visible lower extremities    [x] Normal Mood  [] Anxious appearing    [] Depressed appearing  [] Confused appearing      [] Poor short term memory  [] Poor long term memory    [] OTHER:      Due to this being a TeleHealth encounter, evaluation of the following organ systems is limited: Vitals/Constitutional/EENT/Resp/CV/GI//MS/Neuro/Skin/Heme-Lymph-Imm. ASSESSMENT/PLAN:  1. Vitamin D deficiency    - vitamin D (DIALYVITE VITAMIN D 5000) 125 MCG (5000 UT) CAPS capsule; Take 1 capsule by mouth in the morning. Dispense: 90 capsule; Refill: 1    2. Chronic constipation    - Wheat Dextrin (BENEFIBER DRINK MIX) PACK; 1 packet daily. Dispense: 28 each; Refill: 5  - Probiotic Acidophilus (FLORANEX) TABS; Take 1 tablet by mouth in the morning. Dispense: 30 tablet; Refill: 0  - docusate sodium (COLACE) 100 MG capsule; Take 1 to 2 capsules daily for chronic hard stool. Dispense: 60 capsule; Refill: 5    Resent medications, did not previously . Return Friday for pap. No follow-ups on file. An  electronic signature was used to authenticate this note.     --Obi Varghese PA-C on 8/16/2022 at 1:49 PM        Pursuant to the emergency declaration under the [facility], [facility] waiver authority and the [employer] and [employer] Act, this Virtual  Visit was conducted, with patient's consent, to reduce the patient's risk of exposure to COVID-19 and provide continuity of care for an established patient. Services were provided through a video synchronous discussion virtually to substitute for in-person clinic visit.

## 2022-10-24 RX ORDER — FAMOTIDINE 40 MG/1
40 TABLET, FILM COATED ORAL NIGHTLY PRN
Qty: 30 TABLET | Refills: 3 | Status: SHIPPED | OUTPATIENT
Start: 2022-10-24

## 2023-09-13 ENCOUNTER — HOSPITAL ENCOUNTER (EMERGENCY)
Age: 23
Discharge: HOME | End: 2023-09-13

## 2023-09-13 ENCOUNTER — APPOINTMENT (OUTPATIENT)
Dept: GENERAL RADIOLOGY | Age: 23
End: 2023-09-13

## 2023-09-13 VITALS
BODY MASS INDEX: 29.03 KG/M2 | DIASTOLIC BLOOD PRESSURE: 78 MMHG | WEIGHT: 185 LBS | HEART RATE: 78 BPM | SYSTOLIC BLOOD PRESSURE: 121 MMHG | RESPIRATION RATE: 20 BRPM | HEIGHT: 67 IN | OXYGEN SATURATION: 99 % | TEMPERATURE: 97.1 F

## 2023-09-13 DIAGNOSIS — M25.511 ACUTE PAIN OF RIGHT SHOULDER: Primary | ICD-10-CM

## 2023-09-13 PROCEDURE — 99283 EMERGENCY DEPT VISIT LOW MDM: CPT

## 2023-09-13 PROCEDURE — 73030 X-RAY EXAM OF SHOULDER: CPT

## 2023-09-13 RX ORDER — NAPROXEN 500 MG/1
500 TABLET ORAL 2 TIMES DAILY WITH MEALS
Qty: 60 TABLET | Refills: 0 | Status: SHIPPED | OUTPATIENT
Start: 2023-09-13

## 2023-09-13 ASSESSMENT — PAIN DESCRIPTION - DESCRIPTORS: DESCRIPTORS: OTHER (COMMENT)

## 2023-09-13 ASSESSMENT — PAIN SCALES - GENERAL: PAINLEVEL_OUTOF10: 1

## 2023-09-13 ASSESSMENT — PAIN DESCRIPTION - PAIN TYPE: TYPE: ACUTE PAIN

## 2023-09-13 ASSESSMENT — PAIN DESCRIPTION - LOCATION: LOCATION: SHOULDER

## 2023-09-13 ASSESSMENT — PAIN DESCRIPTION - ORIENTATION: ORIENTATION: RIGHT

## 2023-09-13 ASSESSMENT — PAIN - FUNCTIONAL ASSESSMENT: PAIN_FUNCTIONAL_ASSESSMENT: 0-10

## 2023-09-13 NOTE — DISCHARGE INSTRUCTIONS
Take medications as directed. ZULAY. Follow-up with PCP, [facility]. Return to ED if any new, or worsening symptoms.